# Patient Record
Sex: MALE | Race: WHITE
[De-identification: names, ages, dates, MRNs, and addresses within clinical notes are randomized per-mention and may not be internally consistent; named-entity substitution may affect disease eponyms.]

---

## 2019-08-26 ENCOUNTER — HOSPITAL ENCOUNTER (EMERGENCY)
Dept: HOSPITAL 41 - JD.ED | Age: 79
Discharge: HOME | End: 2019-08-26
Payer: MEDICARE

## 2019-08-26 DIAGNOSIS — R42: Primary | ICD-10-CM

## 2019-08-26 PROCEDURE — 96360 HYDRATION IV INFUSION INIT: CPT

## 2019-08-26 PROCEDURE — 85007 BL SMEAR W/DIFF WBC COUNT: CPT

## 2019-08-26 PROCEDURE — 99284 EMERGENCY DEPT VISIT MOD MDM: CPT

## 2019-08-26 PROCEDURE — 70450 CT HEAD/BRAIN W/O DYE: CPT

## 2019-08-26 PROCEDURE — 93005 ELECTROCARDIOGRAM TRACING: CPT

## 2019-08-26 PROCEDURE — 80053 COMPREHEN METABOLIC PANEL: CPT

## 2019-08-26 PROCEDURE — 36415 COLL VENOUS BLD VENIPUNCTURE: CPT

## 2019-08-26 PROCEDURE — 84484 ASSAY OF TROPONIN QUANT: CPT

## 2019-08-26 PROCEDURE — 85027 COMPLETE CBC AUTOMATED: CPT

## 2019-08-26 NOTE — EDM.PDOC
ED HPI GENERAL MEDICAL PROBLEM





- General


Chief Complaint: Neurological Problem


Stated Complaint: DIZZINESS/WEAKNESS/SLURRED SPEACH


Time Seen by Provider: 08/26/19 11:36





- History of Present Illness


INITIAL COMMENTS - FREE TEXT/NARRATIVE: 





79-year-old male brought in by his family for concerns about slurred speech 

dizziness and possible weakness.





Today he is at baseline except he has some intermittent dizziness with change 

of position yesterday he had an episode that was bothersome to his wife where 

he fell asleep on some patio furniture that they have set up in the garage. He 

also has intermittent dizziness with change of position. He is not drinking a 

lot of fluids other than he drinks a lot of soda pop. He seems to be tired 

often. He has a C Pap machine in the closet but does not use it. Patient has a 

history of coronary artery disease however has not had any recent chest pain 

chest pressure breathing difficulties or shortness of breath he has post 4 

vessel bypass approximately 10 years ago. He's never had a history of a stroke.











- Related Data


 Allergies











Allergy/AdvReac Type Severity Reaction Status Date / Time


 


No Known Allergies Allergy   Verified 08/26/19 11:21














ED ROS GENERAL





- Review of Systems


Review Of Systems: See Below


Constitutional: Reports: No Symptoms


HEENT: Reports: No Symptoms


Respiratory: Reports: No Symptoms


Cardiovascular: Reports: No Symptoms


Endocrine: Reports: No Symptoms


GI/Abdominal: Reports: No Symptoms


: Reports: Other (He has a hard time getting his stream going however he said 

a prostatectomy)


Musculoskeletal: Reports: No Symptoms


Neurological: Reports: Dizziness, Gait Disturbance (When he is lightheaded).  

Denies: Headache, Numbness, Syncope, Tingling


Psychiatric: Reports: No Symptoms


Hematologic/Lymphatic: Reports: No Symptoms


Immunologic: Reports: No Symptoms





ED EXAM, NEURO





- Physical Exam


Exam: See Below


Exam Limited By: No Limitations


General Appearance: Alert, No Apparent Distress


Eye Exam: Bilateral Eye: EOMI, Normal Inspection, PERRL


Ears: Normal External Exam, Normal Canal, Hearing Grossly Normal, Normal TMs


Nose: Normal Inspection, Normal Mucosa, No Blood


Throat/Mouth: Normal Inspection, Normal Lips, Normal Teeth, Normal Gums, Normal 

Oropharynx, Normal Voice, No Airway Compromise, Other (He has most of his 

original teeth)


Neck: Normal Inspection, Supple, Non-Tender, Full Range of Motion.  No: Carotid 

Bruit, Lymphadenopathy (L), Lymphadenopathy (R)


Respiratory/Chest: No Respiratory Distress, Lungs Clear, Normal Breath Sounds


Cardiovascular: Regular Rate, Rhythm, No Edema, No Murmur


GI/Abdominal: Normal Bowel Sounds, Soft, Non-Tender


Neurological: Alert, Normal Mood/Affect, Other (Cranial nerves II through XII 

grossly intact all muscle groups the upper lower extremities are equal and 

appropriate bilaterally deep tendon reflexes are equal and appropriate at the 

brachial radialis bilaterally cerebellar testing is entirely normal bilaterally 

the patient can stand on one leg is dragging his other leg down the inside of 

his weightbearing leg bilaterally finger to nose is normal)


Back Exam: Normal Inspection.  No: CVA Tenderness (L), CVA Tenderness (R)


Extremities: Normal Inspection, Non-Tender


Skin Exam: Warm, Dry, Intact





EKG INTERPRETATION


EKG Date: 08/26/19


Rhythm: NSR


Rate (Beats/Min): 52


Axis: Normal


P-Wave: Present


QRS: Normal


ST-T: Normal


QT: Normal


Comparison: NA - No Prior EKG


EKG Interpretation Comments: 





No clear abnormalities identified normal EKG





Course





- Vital Signs


Last Recorded V/S: 


 Last Vital Signs











Temp  36.2 C   08/26/19 11:17


 


Pulse  54 L  08/26/19 11:17


 


Resp  13   08/26/19 11:17


 


BP  157/81 H  08/26/19 11:17


 


Pulse Ox  95   08/26/19 11:17














- Orders/Labs/Meds


Orders: 


 Active Orders 24 hr











 Category Date Time Status


 


 EKG Documentation Completion [RC] STAT Care  08/26/19 12:07 Active











Labs: 


 Laboratory Tests











  08/26/19 08/26/19 Range/Units





  11:25 11:25 


 


WBC  4.88   (4.23-9.07)  K/mm3


 


RBC  4.92   (4.63-6.08)  M/mm3


 


Hgb  14.2   (13.7-17.5)  gm/L


 


Hct  43.9   (40.1-51.0)  %


 


MCV  89.2   (79.0-92.2)  fl


 


MCH  28.9   (25.7-32.2)  pg


 


MCHC  32.3   (32.2-35.5)  g/dl


 


RDW Std Deviation  46.6 H   (35.1-43.9)  fL


 


Plt Count  184   (163-337)  K/mm3


 


MPV  11.7   (9.4-12.3)  fl


 


Neutrophils % (Manual)  58   (40-60)  %


 


Band Neutrophils %  1   (0-10)  %


 


Lymphocytes % (Manual)  26   (20-40)  %


 


Atypical Lymphs %  0   %


 


Monocytes % (Manual)  8   (2-10)  %


 


Eosinophils % (Manual)  2   (0.8-7.0)  %


 


Basophils % (Manual)  5 H   (0.2-1.2)  


 


Platelet Estimate  Adequate   


 


RBC Morph Comment  Normal   


 


Sodium   146 H  (136-145)  mEq/L


 


Potassium   4.2  (3.5-5.1)  mEq/L


 


Chloride   108 H  ()  mEq/L


 


Carbon Dioxide   29  (21-32)  mEq/L


 


Anion Gap   13.2  (5-15)  


 


BUN   22 H  (7-18)  mg/dL


 


Creatinine   1.0  (0.7-1.3)  mg/dL


 


Est Cr Clr Drug Dosing   63.88  mL/min


 


Estimated GFR (MDRD)   > 60  (>60)  mL/min


 


BUN/Creatinine Ratio   22.0 H  (14-18)  


 


Glucose   94  ()  mg/dL


 


Calcium   8.6  (8.5-10.1)  mg/dL


 


Total Bilirubin   0.5  (0.2-1.0)  mg/dL


 


AST   17  (15-37)  U/L


 


ALT   29  (16-63)  U/L


 


Alkaline Phosphatase   69  ()  U/L


 


Troponin I   < 0.017  (0.00-0.056)  ng/mL


 


Total Protein   7.6  (6.4-8.2)  g/dl


 


Albumin   3.8  (3.4-5.0)  g/dl


 


Globulin   3.8  gm/dL


 


Albumin/Globulin Ratio   1.0  (1-2)  











Meds: 


Medications














Discontinued Medications














Generic Name Dose Route Start Last Admin





  Trade Name Grahamq  PRN Reason Stop Dose Admin


 


Lactated Ringer's  500 mls @ 999 mls/hr  08/26/19 12:09  08/26/19 12:13





  Ringers, Lactated  IV  08/26/19 12:39  999 mls/hr





  .BOLUS ONE   Administration





     





     





     





     














- Re-Assessments/Exams


Free Text/Narrative Re-Assessment/Exam: 





08/26/19 12:40


I suspect a lot of his lightheadedness has to do with being a little dry we'll 

give him some fluids and see how he does with this. Had a long discussion about 

the importance of using the C Pap in the health benefits from doing this 

including his fatigue and daytime tiredness me get significantly better as well 

as this will help with his blood pressure and reduce his lifelong risk of 

stroke.


08/26/19 14:00


Patient is doing okay he received a liter of fluid and he is not having any 

more dizziness he's been up and around and walking around the department. I've 

urged him to get out a C Pap unit use it on a pretty regular basis he agrees to 

do so. EKG shows a sinus rhythm rate 52. Otherwise normal EKG





Departure





- Departure


Time of Disposition: 14:07


Disposition: Home, Self-Care 01


Clinical Impression: 


 Lightheadedness








- Discharge Information


Referrals: 


Luigi Foreman MD [Primary Care Provider] - 


Forms:  ED Department Discharge


Additional Instructions: 


Return to emergency room if any questions problems worsening symptoms. 





Follow-up with your regular doctor in one week follow-up with your cardiologist 

as scheduled.





Lots of fluids, not soda pop. As we discussed use your C-pap.





- My Orders


Last 24 Hours: 


My Active Orders





08/26/19 12:07


EKG Documentation Completion [RC] STAT 














- Assessment/Plan


Last 24 Hours: 


My Active Orders





08/26/19 12:07


EKG Documentation Completion [RC] STAT

## 2019-08-26 NOTE — CT
Head CT

 

Technique: Multiple axial sections through the brain were obtained.  

Intravenous contrast was not utilized.

 

Comparison: No prior intracranial imaging is available.

 

Findings: Several lacunar infarcts are seen within the basal ganglia 

which are most likely old.  Ventricles along with basal cisterns and 

sulci over the convexities are within normal limits for the patient's 

age.  No other abnormal parenchymal densities are seen.  No evidence 

of intracranial hemorrhage.  No midline shift or mass effect is seen.

 

Bone window settings were reviewed which show no acute calvarial 

abnormality.  Visualized sinuses are clear.

 

Impression:

1.  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

2.  Nothing acute is appreciated on noncontrast head CT exam.

 

Diagnostic code #2

## 2023-06-22 ENCOUNTER — HOSPITAL ENCOUNTER (OUTPATIENT)
Dept: HOSPITAL 41 - JD.SDS | Age: 83
Discharge: HOME | End: 2023-06-22
Attending: OPHTHALMOLOGY
Payer: OTHER GOVERNMENT

## 2023-06-22 DIAGNOSIS — H25.813: Primary | ICD-10-CM

## 2023-06-22 DIAGNOSIS — M19.90: ICD-10-CM

## 2023-06-22 DIAGNOSIS — Z79.899: ICD-10-CM

## 2023-06-22 DIAGNOSIS — Z87.891: ICD-10-CM

## 2023-06-22 DIAGNOSIS — E78.00: ICD-10-CM

## 2023-06-22 DIAGNOSIS — Z95.1: ICD-10-CM

## 2023-06-22 DIAGNOSIS — I48.91: ICD-10-CM

## 2023-06-22 DIAGNOSIS — Z98.890: ICD-10-CM

## 2023-06-22 DIAGNOSIS — Z79.82: ICD-10-CM

## 2023-06-22 DIAGNOSIS — C61: ICD-10-CM

## 2023-06-22 DIAGNOSIS — Z88.8: ICD-10-CM

## 2023-06-22 DIAGNOSIS — I10: ICD-10-CM

## 2023-06-22 PROCEDURE — V2632 POST CHMBR INTRAOCULAR LENS: HCPCS

## 2023-06-22 PROCEDURE — 66984 XCAPSL CTRC RMVL W/O ECP: CPT

## 2023-06-22 RX ADMIN — POLYMYXIN B SULFATE AND TRIMETHOPRIM SULFATE SCH DROP: 10000; 1 SOLUTION/ DROPS OPHTHALMIC at 13:35

## 2023-06-22 RX ADMIN — POLYMYXIN B SULFATE AND TRIMETHOPRIM SULFATE SCH DROP: 10000; 1 SOLUTION/ DROPS OPHTHALMIC at 12:56

## 2023-06-22 RX ADMIN — TETRACAINE HYDROCHLORIDE SCH ML: 5 SOLUTION OPHTHALMIC at 14:31

## 2023-06-22 RX ADMIN — PILOCARPINE HYDROCHLORIDE SCH ML: 40 SOLUTION/ DROPS OPHTHALMIC at 14:43

## 2023-06-22 RX ADMIN — PHENYLEPHRINE HYDROCHLORIDE SCH ML: 2.5 SOLUTION/ DROPS OPHTHALMIC at 13:15

## 2023-06-22 RX ADMIN — PHENYLEPHRINE HYDROCHLORIDE SCH ML: 2.5 SOLUTION/ DROPS OPHTHALMIC at 13:05

## 2023-06-22 RX ADMIN — CEFUROXIME SCH MG: 750 INJECTION, POWDER, FOR SOLUTION INTRAMUSCULAR; INTRAVENOUS at 14:08

## 2023-06-22 RX ADMIN — POLYMYXIN B SULFATE AND TRIMETHOPRIM SULFATE SCH ML: 10000; 1 SOLUTION/ DROPS OPHTHALMIC at 14:09

## 2023-06-22 RX ADMIN — PHENYLEPHRINE HYDROCHLORIDE SCH ML: 2.5 SOLUTION/ DROPS OPHTHALMIC at 13:25

## 2023-06-22 RX ADMIN — POLYMYXIN B SULFATE AND TRIMETHOPRIM SULFATE SCH ML: 10000; 1 SOLUTION/ DROPS OPHTHALMIC at 14:43

## 2023-06-22 RX ADMIN — PILOCARPINE HYDROCHLORIDE SCH ML: 40 SOLUTION/ DROPS OPHTHALMIC at 14:08

## 2023-06-22 RX ADMIN — LIDOCAINE HYDROCHLORIDE SCH ML: 10 INJECTION, SOLUTION EPIDURAL; INFILTRATION; INTRACAUDAL; PERINEURAL at 14:07

## 2023-06-22 RX ADMIN — CEFUROXIME SCH MG: 750 INJECTION, POWDER, FOR SOLUTION INTRAMUSCULAR; INTRAVENOUS at 14:42

## 2023-06-22 RX ADMIN — TETRACAINE HYDROCHLORIDE SCH DROP: 5 SOLUTION OPHTHALMIC at 14:14

## 2023-06-22 RX ADMIN — PHENYLEPHRINE HYDROCHLORIDE SCH ML: 2.5 SOLUTION/ DROPS OPHTHALMIC at 14:07

## 2023-06-22 RX ADMIN — PHENYLEPHRINE HYDROCHLORIDE SCH ML: 2.5 SOLUTION/ DROPS OPHTHALMIC at 13:46

## 2023-06-22 RX ADMIN — LIDOCAINE HYDROCHLORIDE SCH ML: 10 INJECTION, SOLUTION EPIDURAL; INFILTRATION; INTRACAUDAL; PERINEURAL at 14:32

## 2023-06-22 RX ADMIN — PHENYLEPHRINE HYDROCHLORIDE SCH ML: 2.5 SOLUTION/ DROPS OPHTHALMIC at 14:24

## 2023-06-22 RX ADMIN — TETRACAINE HYDROCHLORIDE SCH DROP: 5 SOLUTION OPHTHALMIC at 14:11

## 2023-06-22 RX ADMIN — TETRACAINE HYDROCHLORIDE SCH DROP: 5 SOLUTION OPHTHALMIC at 14:07

## 2023-08-24 ENCOUNTER — HOSPITAL ENCOUNTER (OUTPATIENT)
Dept: HOSPITAL 41 - JD.SDS | Age: 83
Discharge: HOME | End: 2023-08-24
Attending: OPHTHALMOLOGY
Payer: OTHER GOVERNMENT

## 2023-08-24 DIAGNOSIS — I10: ICD-10-CM

## 2023-08-24 DIAGNOSIS — H25.812: Primary | ICD-10-CM

## 2023-08-24 DIAGNOSIS — E78.00: ICD-10-CM

## 2023-08-24 DIAGNOSIS — Z79.899: ICD-10-CM

## 2023-08-24 DIAGNOSIS — Z88.8: ICD-10-CM

## 2023-08-24 DIAGNOSIS — Z98.41: ICD-10-CM

## 2023-08-24 DIAGNOSIS — Z79.82: ICD-10-CM

## 2023-08-24 DIAGNOSIS — M19.90: ICD-10-CM

## 2023-08-24 DIAGNOSIS — Z96.1: ICD-10-CM

## 2023-08-24 DIAGNOSIS — Z88.7: ICD-10-CM

## 2023-08-24 DIAGNOSIS — I48.91: ICD-10-CM

## 2023-08-24 DIAGNOSIS — Z95.1: ICD-10-CM

## 2023-08-24 PROCEDURE — 66984 XCAPSL CTRC RMVL W/O ECP: CPT

## 2023-08-24 RX ADMIN — POLYMYXIN B SULFATE AND TRIMETHOPRIM SULFATE SCH DROP: 10000; 1 SOLUTION/ DROPS OPHTHALMIC at 07:52

## 2023-08-24 RX ADMIN — CEFUROXIME SCH MG: 750 INJECTION, POWDER, FOR SOLUTION INTRAMUSCULAR; INTRAVENOUS at 09:35

## 2023-08-24 RX ADMIN — TETRACAINE HYDROCHLORIDE SCH DROP: 5 SOLUTION OPHTHALMIC at 09:13

## 2023-08-24 RX ADMIN — POLYMYXIN B SULFATE AND TRIMETHOPRIM SULFATE SCH DROP: 10000; 1 SOLUTION/ DROPS OPHTHALMIC at 08:33

## 2023-08-24 RX ADMIN — TETRACAINE HYDROCHLORIDE SCH ML: 5 SOLUTION OPHTHALMIC at 09:22

## 2023-08-24 RX ADMIN — TETRACAINE HYDROCHLORIDE SCH ML: 5 SOLUTION OPHTHALMIC at 08:24

## 2023-08-24 RX ADMIN — POLYMYXIN B SULFATE AND TRIMETHOPRIM SULFATE SCH ML: 10000; 1 SOLUTION/ DROPS OPHTHALMIC at 09:36

## 2023-08-24 RX ADMIN — CEFUROXIME SCH MG: 750 INJECTION, POWDER, FOR SOLUTION INTRAMUSCULAR; INTRAVENOUS at 08:25

## 2023-08-24 RX ADMIN — LIDOCAINE HYDROCHLORIDE SCH ML: 10 INJECTION, SOLUTION EPIDURAL; INFILTRATION; INTRACAUDAL; PERINEURAL at 09:23

## 2023-08-24 RX ADMIN — TETRACAINE HYDROCHLORIDE SCH DROP: 5 SOLUTION OPHTHALMIC at 09:12

## 2023-08-24 RX ADMIN — LIDOCAINE HYDROCHLORIDE SCH ML: 10 INJECTION, SOLUTION EPIDURAL; INFILTRATION; INTRACAUDAL; PERINEURAL at 08:25

## 2023-08-24 RX ADMIN — TETRACAINE HYDROCHLORIDE SCH DROP: 5 SOLUTION OPHTHALMIC at 09:14
